# Patient Record
Sex: FEMALE | Race: WHITE
[De-identification: names, ages, dates, MRNs, and addresses within clinical notes are randomized per-mention and may not be internally consistent; named-entity substitution may affect disease eponyms.]

---

## 2017-09-14 ENCOUNTER — HOSPITAL ENCOUNTER (OUTPATIENT)
Dept: HOSPITAL 62 - RAD | Age: 57
End: 2017-09-14
Attending: FAMILY MEDICINE
Payer: MEDICARE

## 2017-09-14 DIAGNOSIS — G43.909: Primary | ICD-10-CM

## 2017-09-14 PROCEDURE — 70551 MRI BRAIN STEM W/O DYE: CPT

## 2017-09-14 NOTE — RADIOLOGY REPORT (SQ)
EXAM DESCRIPTION:  MRI HEAD WITHOUT



COMPLETED DATE/TIME:  9/14/2017 11:20 am



REASON FOR STUDY:  MIGRAINE G43.909  MIGRAINE, UNSP, NOT INTRACTABLE, WITHOUT STATUS MIGR



COMPARISON:  None.



TECHNIQUE:  Multiplanar imaging includes non-contrasted T1, T2, FLAIR, and diffusion with ADC map seq
uences. Images stored on PACS.



LIMITATIONS:  None.



FINDINGS:  ANATOMY: No developmental anomalies. Normal vascular flow voids. Pituitary fossa normal.

CSF SPACES: Normal in size and contour. No hemorrhage.

CEREBRUM: Sulci and gyri normal in size and contour.  Few punctate foci of increased white matter sig
nal on FLAIR imaging, likely benign gliosis along perivascular spaces.  .  No evidence of hemorrhage,
 mass, or extraaxial fluid collection.

POSTERIOR FOSSA: No signal alteration. No hemorrhage. No edema, masses or mass effect.  Internal davian
tory canals, cerebello-pontine angles, mastoids normal.

DIFFUSION IMAGING: Negative for acute or sub-acute infarction.

ORBITS: No masses. Globes normal.

PARANASAL  SINUSES: No fluid levels.  Mucosa normal.

OTHER: No other significant finding.



IMPRESSION:  ESSENTIALLY NORMAL MRI OF THE BRAIN WITHOUT INTRAVENOUS GADOLINIUM CONTRAST.

EVIDENCE OF ACUTE STROKE: NO.



TECHNICAL DOCUMENTATION:  JOB ID:  [id number]

 2011 [employer]- All Rights Reserved

## 2017-10-06 ENCOUNTER — HOSPITAL ENCOUNTER (EMERGENCY)
Dept: HOSPITAL 62 - ER | Age: 57
Discharge: HOME | End: 2017-10-06
Payer: MEDICARE

## 2017-10-06 VITALS — DIASTOLIC BLOOD PRESSURE: 67 MMHG | SYSTOLIC BLOOD PRESSURE: 112 MMHG

## 2017-10-06 DIAGNOSIS — G43.011: Primary | ICD-10-CM

## 2017-10-06 DIAGNOSIS — R03.0: ICD-10-CM

## 2017-10-06 DIAGNOSIS — H47.10: ICD-10-CM

## 2017-10-06 LAB
ALBUMIN SERPL-MCNC: 5 G/DL (ref 3.5–5)
ALP SERPL-CCNC: 167 U/L (ref 38–126)
ALT SERPL-CCNC: 22 U/L (ref 9–52)
ANION GAP SERPL CALC-SCNC: 19 MMOL/L (ref 5–19)
APPEARANCE ALL TUBES: CLEAR
APPEARANCE ALL TUBES: CLEAR
APPEARANCE TUBE1 CSF: CLEAR
APPEARANCE TUBE1 CSF: CLEAR
APPEARANCE TUBE2 CSF: CLEAR
APPEARANCE TUBE2 CSF: CLEAR
APPEARANCE TUBE3 CSF: CLEAR
APPEARANCE TUBE3 CSF: CLEAR
AST SERPL-CCNC: 22 U/L (ref 14–36)
BASOPHILS # BLD AUTO: 0 10^3/UL (ref 0–0.2)
BASOPHILS NFR BLD AUTO: 0.5 % (ref 0–2)
BILIRUB DIRECT SERPL-MCNC: 0.1 MG/DL (ref 0–0.4)
BILIRUB SERPL-MCNC: 0.5 MG/DL (ref 0.2–1.3)
BUN SERPL-MCNC: 15 MG/DL (ref 7–20)
CALCIUM: 10.3 MG/DL (ref 8.4–10.2)
CHLORIDE SERPL-SCNC: 99 MMOL/L (ref 98–107)
CO2 SERPL-SCNC: 27 MMOL/L (ref 22–30)
CREAT SERPL-MCNC: 1.19 MG/DL (ref 0.52–1.25)
EOSINOPHIL # BLD AUTO: 0.1 10^3/UL (ref 0–0.6)
EOSINOPHIL NFR BLD AUTO: 1.3 % (ref 0–6)
ERYTHROCYTE [DISTWIDTH] IN BLOOD BY AUTOMATED COUNT: 13.7 % (ref 11.5–14)
GLUCOSE CSF-MCNC: 89 MG/DL (ref 40–70)
GLUCOSE SERPL-MCNC: 132 MG/DL (ref 75–110)
HCT VFR BLD CALC: 39.8 % (ref 36–47)
HGB BLD-MCNC: 13.4 G/DL (ref 12–15.5)
HGB HCT DIFFERENCE: 0.4
LYMPHOCYTES # BLD AUTO: 2.9 10^3/UL (ref 0.5–4.7)
LYMPHOCYTES NFR BLD AUTO: 30.3 % (ref 13–45)
MCH RBC QN AUTO: 29.4 PG (ref 27–33.4)
MCHC RBC AUTO-ENTMCNC: 33.8 G/DL (ref 32–36)
MCV RBC AUTO: 87 FL (ref 80–97)
MONOCYTES # BLD AUTO: 0.6 10^3/UL (ref 0.1–1.4)
MONOCYTES NFR BLD AUTO: 6.4 % (ref 3–13)
NEUTROPHILS # BLD AUTO: 5.8 10^3/UL (ref 1.7–8.2)
NEUTS SEG NFR BLD AUTO: 61.5 % (ref 42–78)
POTASSIUM SERPL-SCNC: 4 MMOL/L (ref 3.6–5)
PROT SERPL-MCNC: 8.8 G/DL (ref 6.3–8.2)
RBC # BLD AUTO: 4.58 10^6/UL (ref 3.72–5.28)
RBC AVERAGE: 136.5
RBC AVERAGE: 26
RBC DILUENT USED: (no result)
RBC DILUENT USED: (no result)
RBC DILUTION FACTOR: 1
RBC DILUTION FACTOR: 1
RBC SIDE 1: 135
RBC SIDE 1: 27
RBC SIDE 2: 138
RBC SIDE 2: 25
SODIUM SERPL-SCNC: 144.8 MMOL/L (ref 137–145)
TOTAL RBC SQUARES COUNTED: 225
TOTAL RBC SQUARES COUNTED: 50
WBC # BLD AUTO: 9.5 10^3/UL (ref 4–10.5)

## 2017-10-06 PROCEDURE — 87070 CULTURE OTHR SPECIMN AEROBIC: CPT

## 2017-10-06 PROCEDURE — 86480 TB TEST CELL IMMUN MEASURE: CPT

## 2017-10-06 PROCEDURE — 80053 COMPREHEN METABOLIC PANEL: CPT

## 2017-10-06 PROCEDURE — 86696 HERPES SIMPLEX TYPE 2 TEST: CPT

## 2017-10-06 PROCEDURE — 84157 ASSAY OF PROTEIN OTHER: CPT

## 2017-10-06 PROCEDURE — 86812 HLA TYPING A B OR C: CPT

## 2017-10-06 PROCEDURE — 86787 VARICELLA-ZOSTER ANTIBODY: CPT

## 2017-10-06 PROCEDURE — 86225 DNA ANTIBODY NATIVE: CPT

## 2017-10-06 PROCEDURE — 87205 SMEAR GRAM STAIN: CPT

## 2017-10-06 PROCEDURE — 86617 LYME DISEASE ANTIBODY: CPT

## 2017-10-06 PROCEDURE — 96374 THER/PROPH/DIAG INJ IV PUSH: CPT

## 2017-10-06 PROCEDURE — 82164 ANGIOTENSIN I ENZYME TEST: CPT

## 2017-10-06 PROCEDURE — 83516 IMMUNOASSAY NONANTIBODY: CPT

## 2017-10-06 PROCEDURE — 36415 COLL VENOUS BLD VENIPUNCTURE: CPT

## 2017-10-06 PROCEDURE — 86430 RHEUMATOID FACTOR TEST QUAL: CPT

## 2017-10-06 PROCEDURE — 00JU3ZZ INSPECTION OF SPINAL CANAL, PERCUTANEOUS APPROACH: ICD-10-PCS | Performed by: EMERGENCY MEDICINE

## 2017-10-06 PROCEDURE — 86256 FLUORESCENT ANTIBODY TITER: CPT

## 2017-10-06 PROCEDURE — 70450 CT HEAD/BRAIN W/O DYE: CPT

## 2017-10-06 PROCEDURE — 86778 TOXOPLASMA ANTIBODY IGM: CPT

## 2017-10-06 PROCEDURE — 86695 HERPES SIMPLEX TYPE 1 TEST: CPT

## 2017-10-06 PROCEDURE — 86618 LYME DISEASE ANTIBODY: CPT

## 2017-10-06 PROCEDURE — 99284 EMERGENCY DEPT VISIT MOD MDM: CPT

## 2017-10-06 PROCEDURE — 86235 NUCLEAR ANTIGEN ANTIBODY: CPT

## 2017-10-06 PROCEDURE — 96375 TX/PRO/DX INJ NEW DRUG ADDON: CPT

## 2017-10-06 PROCEDURE — 71020: CPT

## 2017-10-06 PROCEDURE — 62270 DX LMBR SPI PNXR: CPT

## 2017-10-06 PROCEDURE — 89050 BODY FLUID CELL COUNT: CPT

## 2017-10-06 PROCEDURE — 86682 HELMINTH ANTIBODY: CPT

## 2017-10-06 PROCEDURE — 86592 SYPHILIS TEST NON-TREP QUAL: CPT

## 2017-10-06 PROCEDURE — 86780 TREPONEMA PALLIDUM: CPT

## 2017-10-06 PROCEDURE — 85025 COMPLETE CBC W/AUTO DIFF WBC: CPT

## 2017-10-06 PROCEDURE — 82945 GLUCOSE OTHER FLUID: CPT

## 2017-10-06 PROCEDURE — 87496 CYTOMEG DNA AMP PROBE: CPT

## 2017-10-06 NOTE — ER DOCUMENT REPORT
ED Medical Screen (RME)





- General


Chief Complaint: Headache >24 hrs old


Stated Complaint: ABNORMAL LABS


Time Seen by Provider: 10/06/17 14:29


Mode of Arrival: Ambulatory


Information source: Patient


Notes: 





pt sent in by Dr Duckworth for multiple tests imaging





I have greeted and performed a rapid initial assessment of this patient.  A 

comprehensive ED assessment and evaluation of the patient, analysis of test 

results and completion of the medical decision making process will be conducted 

by additional ED providers.





PHYSICAL EXAMINATION:





GENERAL: Well-appearing, well-nourished and in no acute distress.





HEAD: Atraumatic, normocephalic.





EYES: Pupils equal round extraocular movements intact,  conjunctiva are normal.





ENT: Nares patent





NECK: Normal range of motion





LUNGS: No respiratory distress





Musculoskeletal: Normal range of motion





NEUROLOGICAL:  Normal speech, normal gait. 





PSYCH: Normal mood, normal affect.





SKIN: Warm, Dry, normal turgor, no rashes or lesions noted.


TRAVEL OUTSIDE OF THE [place] IN LAST 30 DAYS: No





- Related Data


Allergies/Adverse Reactions: 


 





Penicillins Allergy (Verified 10/06/17 14:14)


 











Past Medical History


Renal/ Medical History: Denies: Hx Peritoneal Dialysis





Physical Exam





- Vital signs


Vitals: 





 











Temp Pulse Resp BP Pulse Ox


 


 98.3 F   129 H  16   135/81 H  96 


 


 10/06/17 14:12  10/06/17 14:12  10/06/17 14:12  10/06/17 14:12  10/06/17 14:12














Course





- Vital Signs


Vital signs: 





 











Temp Pulse Resp BP Pulse Ox


 


 98.3 F   129 H  16   135/81 H  96 


 


 10/06/17 14:12  10/06/17 14:12  10/06/17 14:12  10/06/17 14:12  10/06/17 14:12

## 2017-10-06 NOTE — RADIOLOGY REPORT (SQ)
EXAM DESCRIPTION:  CT HEAD WITHOUT



COMPLETED DATE/TIME:  10/6/2017 3:06 pm



REASON FOR STUDY:  headache



COMPARISON:  MR of the head 9/14/2017



TECHNIQUE:  Axial images acquired through the brain without intravenous contrast.  Images reviewed wi
th bone, brain and subdural windows.  Images stored on PACS.

All CT scanners at this facility use dose modulation, iterative reconstruction, and/or weight based d
osing when appropriate to reduce radiation dose to as low as reasonably achievable (ALARA).

[facility]: Dose Right  [facility]: CareDose    [facility]: Dose Right    [facility]: Teradose 4D    [facility]: Smart Technologies



RADIATION DOSE:  Up-to-date CT equipment and radiation dose reduction techniques were employed. CTDIv
ol: 64.6 mGy. DLP: 1034 mGy-cm. mGy.



LIMITATIONS:  None.



FINDINGS:  VENTRICLES: Normal size and contour.

CEREBRUM: No masses.  No hemorrhage.  No midline shift.  No evidence for acute infarction. Normal gra
y/white matter differentiation. No areas of low density in the white matter.

CEREBELLUM: No masses.  No hemorrhage.  No alteration of density.  No evidence for acute infarction.

EXTRAAXIAL SPACES: No fluid collections.  No masses.

ORBITS AND GLOBE: No intra- or extraconal masses.  Normal contour of globe without masses.

CALVARIUM: No fracture.

PARANASAL SINUSES: No fluid or mucosal thickening.

SOFT TISSUES: No mass or hematoma.

OTHER: No other significant finding.



IMPRESSION:  NORMAL BRAIN CT WITHOUT CONTRAST.

EVIDENCE OF ACUTE STROKE: NO.



COMMENT:  Quality ID # 436: Final reports with documentation of one or more dose reduction techniques
 (e.g., Automated exposure control, adjustment of the mA and/or kV according to patient size, use of 
iterative reconstruction technique)



TECHNICAL DOCUMENTATION:  JOB ID:  [id number]

 2011 [employer]- All Rights Reserved

## 2017-10-06 NOTE — RADIOLOGY REPORT (SQ)
EXAM DESCRIPTION:  CHEST PA/LAT



COMPLETED DATE/TIME:  10/6/2017 3:16 pm



REASON FOR STUDY:  headache



COMPARISON:  11/12/2009



EXAM PARAMETERS:  NUMBER OF VIEWS: two views

TECHNIQUE: Digital Frontal and Lateral radiographic views of the chest acquired.

RADIATION DOSE: NA

LIMITATIONS: Overlying breast tissue.



FINDINGS:  LUNGS AND PLEURA: No opacities, masses or pneumothorax. No pleural effusion.

MEDIASTINUM AND HILAR STRUCTURES: No masses or contour abnormalities.

HEART AND VASCULAR STRUCTURES: Heart normal size.  No evidence for failure.

BONES: No acute findings.

HARDWARE: None in the chest.

OTHER: No other significant finding.



IMPRESSION:  NO ACUTE RADIOGRAPHIC FINDING IN THE CHEST.



TECHNICAL DOCUMENTATION:  JOB ID:  [id number]

 2011 [employer]- All Rights Reserved

## 2017-10-06 NOTE — ER DOCUMENT REPORT
ED General





- General


Mode of Arrival: Ambulatory


Information source: Patient


TRAVEL OUTSIDE OF THE [place] IN LAST 30 DAYS: No





<SHEELA MCCABE - Last Filed: 10/06/17 18:18>





<MARY HUGHES - Last Filed: 10/06/17 18:56>





- General


Chief Complaint: Headache >24 hrs old


Stated Complaint: ABNORMAL LABS


Time Seen by Provider: 10/06/17 14:29


Notes: 





Patient is a 57 year old female that presents to the emergency department today 

after being seen by a retina specialist and was found to have papilledema. Dr. Duckworth (ophthalmology) called to inform ED staff of the patient and labs and 

procedures that he wanted done to rule out possible meningitis. Patient states 

her only symptom has been migraine headaches. Patient states she has a history 

of migraine headaches but they have been getting worse over the last month. 

Patient denies any focal neurological deficits.  (SHEELA MCCABE)





- Related Data


Allergies/Adverse Reactions: 


 





Penicillins Allergy (Verified 10/06/17 14:14)


 











Past Medical History





- General


Information source: Patient





- Social History


Smoking Status: Unknown if Ever Smoked


Cigarette use (# per day): No


Frequency of alcohol use: Rare


Drug Abuse: None


Lives with: Family


Family History: Reviewed & Not Pertinent


Patient has suicidal ideation: No


Patient has homicidal ideation: No





- Medical History


Medical History: Negative


Surgical Hx: Negative





<SHEELA MCCABE - Last Filed: 10/06/17 18:18>





Review of Systems





- Review of Systems


Constitutional: No symptoms reported


EENT: No symptoms reported


Cardiovascular: No symptoms reported


Respiratory: No symptoms reported


Gastrointestinal: No symptoms reported


Genitourinary: No symptoms reported


Female Genitourinary: No symptoms reported


Musculoskeletal: No symptoms reported


Skin: No symptoms reported


Hematologic/Lymphatic: No symptoms reported


Neurological/Psychological: See HPI, Headaches


-: Yes All other systems reviewed and negative





<SHEELA CMCABE - Last Filed: 10/06/17 18:18>





Physical Exam





<SHEELA MCCABE - Last Filed: 10/06/17 18:18>





<MARY HUGHES - Last Filed: 10/06/17 18:56>





- Vital signs


Vitals: 





 











Temp Pulse Resp BP Pulse Ox


 


 98.3 F   129 H  16   135/81 H  96 


 


 10/06/17 14:12  10/06/17 14:12  10/06/17 14:12  10/06/17 14:12  10/06/17 14:12














- Notes


Notes: 





PHYSICAL EXAM


 


GENERAL: Alert, interacts well. No acute distress.


HEAD: Normocephalic, atraumatic.


EYES: Pupils equal, round, and reactive to light. Extraocular movements intact.


ENT: Oral mucosa moist, tongue midline. 


NECK: Full range of motion. Supple. Trachea midline.


LUNGS: Clear to auscultation bilaterally, no wheezes, rales, or rhonchi. No 

respiratory distress.


HEART: Regular rate and rhythm. No murmurs, gallops, or rubs.


ABDOMEN: Obese, soft, non-tender. Non-distended. Bowel sounds present in all 4 

quadrants.


EXTREMITIES: Moves all 4 extremities spontaneously. No cyanosis.


NEUROLOGICAL: Alert and oriented x3. Normal speech. No focal neurological 

deficits. 


PSYCH: Normal affect, normal mood.


SKIN: Warm, dry, normal turgor. No rashes or lesions noted. (SHEELA MCCABE)





Course





- Laboratory


Result Diagrams: 


 10/06/17 15:30





 10/06/17 15:30





<SHEELA MCCABE - Last Filed: 10/06/17 18:18>





- Laboratory


Result Diagrams: 


 10/06/17 15:30





 10/06/17 15:30





<MARY HUGHES - Last Filed: 10/06/17 18:56>





- Re-evaluation


Re-evalutation: 





10/06/17 18:54


CBC unremarkable, CMP grossly unremarkable, no abnormalities that would affect 

today's diagnosis, lumbar puncture shows a traumatic tap with 682 RBCs in the 

first tube and down to 28 RBCs in the's fourth tube, no evidence of infection, 

2 WBC in the first and 1 in the fourth tube.  Slightly elevated glucose, normal 

protein, all of the other laboratory studies that were ordered were ordered on 

behalf of Dr. Duckworth and at his request, these will be followed up by him as an 

outpatient.  Patient's headache was improved significantly by Toradol, Compazine

, Benadryl.  Patient will be given Decadron to help prevent rebound headache 

and will be discharged to home.  Patient was given Ativan to help with her 

anxiety regarding the lumbar puncture. (MARY HUGHES)





- Vital Signs


Vital signs: 





 











Temp Pulse Resp BP Pulse Ox


 


 98.3 F   129 H  16   135/81 H  96 


 


 10/06/17 14:12  10/06/17 14:12  10/06/17 14:12  10/06/17 14:12  10/06/17 14:12














- Laboratory


Laboratory results interpreted by me: 





 











  10/06/17 10/06/17





  15:30 17:45


 


Est GFR ( Amer)  57 L 


 


Est GFR (Non-Af Amer)  47 L 


 


Glucose  132 H 


 


Calcium  10.3 H 


 


Alkaline Phosphatase  167 H 


 


Total Protein  8.8 H 


 


CSF Glucose   89 H














Procedures





- Lumbar Puncture


  ** Lumbar puncture


Consent obtained: Yes


Lumbar puncture pre-procedure: Sterile PPE donned, Betadine prep applied, 

Sterile drapes applied


Patient position: Lying


Needle size: 22


Lumbar puncture location: L3-L4


Anesthetic type: 1% Lidocaine


mL's of anesthetic: 10


Amount/type of drainage: 6 mL clear


Number of attempts: 1


Complications: No





<MARY HUGHES - Last Filed: 10/06/17 18:56>





- Lumbar Puncture


  ** Lumbar puncture


Notes: 





10/06/17 18:54


Opening pressure 18. (MARY HUGHES)





Discharge





<SHEELA MCCABE - Last Filed: 10/06/17 18:18>





<MARY HUGHES - Last Filed: 10/06/17 18:56>





- Discharge


Clinical Impression: 


 Papilledema, Prehypertension





Migraine


Qualifiers:


 Migraine type: without aura Status migrainosus presence: with status 

migrainosus Intractability: intractable Qualified Code(s): G43.011 - Migraine 

without aura, intractable, with status migrainosus





Condition: Stable


Disposition: HOME, SELF-CARE


Additional Instructions: 


Today your CAT scan did not show any signs of bleeding or mass.  Your lumbar 

puncture did not show any increased intracranial pressure, bleeding or 

infection.  We treated her headache with Toradol, Compazine and Benadryl.  You 

have been given Decadron to help prevent a rebound headache.  Please follow-up 

with Dr. Duckworth as an outpatient.  He will need to contact to the hospital to get 

the rest of your blood work results.


Scribe Attestation: 





10/06/17 18:56


I personally performed the services described in the documentation, reviewed 

and edited the documentation which was dictated to the scribe in my presence, 

and it accurately records my words and actions. (KLOCEK,MARY)





Scribe Documentation





- Scribe


Written by Zamzam:: Zamzam Adams, 10/6/2017 1833


acting as scribe for :: Markie





<SHEELA MCCABE - Last Filed: 10/06/17 18:18>

## 2017-10-09 LAB
ENA JO1 AB SER-ACNC: <0.2 AI (ref 0–0.9)
LYME DISEASE IGG AND IGM AB: <0.91 ISR (ref 0–0.9)
T GONDII IGM SER QL: <3 AU/ML (ref 0–7.9)

## 2018-01-31 ENCOUNTER — HOSPITAL ENCOUNTER (EMERGENCY)
Dept: HOSPITAL 62 - ER | Age: 58
Discharge: HOME | End: 2018-01-31
Payer: MEDICARE

## 2018-01-31 VITALS — DIASTOLIC BLOOD PRESSURE: 74 MMHG | SYSTOLIC BLOOD PRESSURE: 104 MMHG

## 2018-01-31 DIAGNOSIS — Z98.890: ICD-10-CM

## 2018-01-31 DIAGNOSIS — Z88.0: ICD-10-CM

## 2018-01-31 DIAGNOSIS — M54.2: Primary | ICD-10-CM
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
## 2018-01-31 DIAGNOSIS — I10: ICD-10-CM

## 2018-01-31 DIAGNOSIS — M25.512: ICD-10-CM

## 2018-01-31 DIAGNOSIS — J45.909: ICD-10-CM

## 2018-01-31 PROCEDURE — 99284 EMERGENCY DEPT VISIT MOD MDM: CPT

## 2018-01-31 PROCEDURE — 72125 CT NECK SPINE W/O DYE: CPT

## 2018-01-31 NOTE — ER DOCUMENT REPORT
ED Fall





- General


Mode of Arrival: Ambulatory


Information source: Patient


TRAVEL OUTSIDE OF THE [place] IN LAST 30 DAYS: No





- General


Chief Complaint: Fall Injury


Stated Complaint: FALL/SHOULDER AND NECK PAIN


Time Seen by Provider: 18 13:52


Notes: 


Patient is a 57 year old female that presents to the emergency department today 

with complaints of a fall that occurred yesterday. Patient states she fell down 

approximately 12 steps. Patient states she is unsure if she hit her head. 

Patient did have recent neck surgery on 2018. Patient complains of neck 

pain and left shoulder pain. Patient denies vision changes, nausea, vomiting, 

or usage of blood thinners. (SHEELA MCCABE)





- Related data


Allergies/Adverse Reactions: 


 





Penicillins Allergy (Verified 18 13:22)


 











Past Medical History





- General


Information source: Patient





- Social History


Smoking Status: Unknown if Ever Smoked


Frequency of alcohol use: Rare


Drug Abuse: None


Lives with: Family


Family History: Reviewed & Not Pertinent


Patient has suicidal ideation: No


Patient has homicidal ideation: No





- Past Medical History


Cardiac Medical History: Reports: Hx Hypertension


Pulmonary Medical History: Reports: Hx Asthma


GI Medical History: Reports: Hx Gastroesophageal Reflux Disease


Past Surgical History: Reports: Hx  Section, Hx Orthopedic Surgery - 

neck





Review of Systems





- Review of Systems


Constitutional: No symptoms reported


EENT: denies: Blurred vision, Double vision


Cardiovascular: No symptoms reported


Respiratory: No symptoms reported


Gastrointestinal: denies: Nausea, Vomiting


Genitourinary: No symptoms reported


Female Genitourinary: No symptoms reported


Musculoskeletal: See HPI, Joint pain - left shoulder, Neck pain


Skin: No symptoms reported


Hematologic/Lymphatic: No symptoms reported


Neurological/Psychological: No symptoms reported


-: Yes All other systems reviewed and negative





Physical Exam





- Vital signs


Vitals: 


 











Temp Pulse Resp BP Pulse Ox


 


 98.4 F   125 H  20   109/54 L  99 


 


 18 13:30  18 13:30  18 13:30  18 13:30  18 13:30














- Notes


Notes: 





Physical Exam:


 


General: Alert, appears well. 


 


HEENT: Normocephalic. Atraumatic. PERRL. Extraocular movements intact. 

Oropharynx clear.


 


Neck: Soft collar in place, cervical spine tenderness with palpation, no step-

off or deformities. 





Respiratory: No respiratory distress. Clear and equal breath sounds bilaterally.


 


Cardiovascular: Regular rate and rhythm. 


 


Abdominal: Normal Inspection. Non-tender. No distension. Normal Bowel Sounds. 


 


Back: Non-tender. No deformity or step off.


 


Extremities: Moves all four extremities.


Upper extremities: Unable to raise bilateral upper extremities above shoulder 

level secondary to pain.


Lower extremities: Normal inspection. No edema. Normal ROM.


 


Neurological: Normal cognition. AAOx4. Normal speech.  


 


Psychological: Normal affect. Normal Mood. 


 


Skin: Warm. Dry. Normal color. (SHEELA MCCABE)





Course





- Re-evaluation


Re-evalutation: 





18 15:22


Imaging shows no acute abnormalities patient well-appearing with steady gait 

with no bruising on her body.  She is requesting narcotics.  Reviewing charts 

she was provided a narcotic pills just on .  I explained that I 

cannot prescribe her any more narcotic medications and she had a follow-up with 

her primary care physician.  I did discuss that her imaging was negative for 

any fractures or dislocations. (GISSELLE CHRISTENSEN)





- Vital Signs


Vital signs: 


 











Temp Pulse Resp BP Pulse Ox


 


 99.0 F   103 H  18   104/74   98 


 


 18 15:28  18 15:28  18 15:28  18 15:28  18 15:28














Discharge





- Discharge


Clinical Impression: 


 Fall (on) (from) other stairs and steps, initial encounter





Condition: Good


Disposition: HOME, SELF-CARE


Instructions:  Muscle Strain ([facility])


Additional Instructions: 


Please follow-up with your primary care physician regarding her pain 

medications.


Prescriptions: 


Naproxen 500 mg PO BID #30 tablet


Referrals: 


MANUEL SMITH MD [Primary Care Provider] - Follow up as needed





Scribe Documentation





- Scribe


Written by Scribe:: Zamzam Adams, 2018 1630


acting as scribe for :: Dex

## 2018-01-31 NOTE — RADIOLOGY REPORT (SQ)
EXAM DESCRIPTION:  HIP LEFT AP/LATERAL



COMPLETED DATE/TIME:  1/31/2018 2:29 pm



REASON FOR STUDY:  trauma



COMPARISON:  None.



NUMBER OF VIEWS:  Two views.



TECHNIQUE:  AP pelvis and additional frog-leg view of the left hip.



LIMITATIONS:  Large patient



FINDINGS:  MINERALIZATION: Normal.

LEFT HIP: No fracture or dislocation.  No worrisome bone lesions.  No significant joint space narrowi
ng.  Minimal acetabular rim bony spurring.

RIGHT HIP: No fracture or dislocation.  No worrisome bone lesions.  No significant joint space narrow
ing or acetabular rim bony spurring.

PUBIS AND ISCHIUM: No fracture.

PELVIS: No fracture.

SACRUM: No fracture or dislocation. No worrisome bone lesions.

LOWER LUMBAR SPINE: Lower lumbar bilateral facet arthropathy at L4-5 and L5-S1

SOFT TISSUES: No findings.

OTHER: No other significant finding.



IMPRESSION:  No acute fracture



TECHNICAL DOCUMENTATION:  JOB ID:  [id number]

 2011 [employer]- All Rights Reserved

## 2018-01-31 NOTE — RADIOLOGY REPORT (SQ)
EXAM DESCRIPTION:  SHOULDER LEFT 2 OR MORE VIEWS



COMPLETED DATE/TIME:  1/31/2018 2:29 pm



REASON FOR STUDY:  trauma



COMPARISON:  None.



NUMBER OF VIEWS:  Three views.



TECHNIQUE:  Internal rotation, external rotation, and Y view images acquired of the left shoulder.



LIMITATIONS:  None.



FINDINGS:  MINERALIZATION: Normal.

BONES: No acute fracture or dislocation.  No worrisome bone lesions.

JOINTS: No dislocation.

VISUALIZED LUNGS AND RIBS: No pneumothorax.  No rib fracture.

SOFT TISSUES: No radiopaque foreign body.

OTHER: No other significant finding.



IMPRESSION:  NEGATIVE STUDY OF THE LEFT SHOULDER. NO RADIOGRAPHIC EVIDENCE OF ACUTE INJURY.



TECHNICAL DOCUMENTATION:  JOB ID:  [id number]

 2011 [employer]- All Rights Reserved

## 2018-01-31 NOTE — RADIOLOGY REPORT (SQ)
EXAM DESCRIPTION:  CT CERVICAL SPINE WITHOUT



COMPLETED DATE/TIME:  1/31/2018 2:31 pm



REASON FOR STUDY:  trauma



COMPARISON:  None.



TECHNIQUE:  Axial images acquired through the cervical spine without intravenous contrast.  Images re
viewed with lung, soft tissue and bone windows.  Reconstructed coronal and sagittal MPR images review
ed.  Images stored on PACS.

All CT scanners at this facility use dose modulation, iterative reconstruction, and/or weight based d
osing when appropriate to reduce radiation dose to as low as reasonably achievable (ALARA).

[facility]: Dose Right  [facility]: CareDose    [facility]: Dose Right    [facility]: Teradose 4D    [facility]: Smart Brav[employer]ly



RADIATION DOSE:  CT Rad equipment meets quality standard of care and radiation dose reduction techniq
ues were employed. CTDIvol: 21.4 mGy. DLP: 441 mGy-cm. mGy.



LIMITATIONS:  Motion.  Artifact from multilevel ACD.



FINDINGS:  ALIGNMENT: Anatomic.

MINERALIZATION: Normal.

VERTEBRAL BODIES: No fractures or dislocation.

DISCS: See below.

FACETS, LATERAL MASSES, POSTERIOR ELEMENTS: No fractures.  No dislocation.  No acute findings.

HARDWARE: Status post ACD C3- 4, C4-5, C5-6 and C6-7.

VISUALIZED RIBS: No fractures.

LUNG APICES AND SOFT TISSUES: No significant or acute findings.

OTHER: No other significant finding.



IMPRESSION:  Postsurgical changes.  No acute findings.



TECHNICAL DOCUMENTATION:  JOB ID:  [id number]

Quality ID # 436: Final reports with documentation of one or more dose reduction techniques (e.g., Au
tomated exposure control, adjustment of the mA and/or kV according to patient size, use of iterative 
reconstruction technique)

 2011 [employer]- All Rights Reserved

## 2018-03-21 ENCOUNTER — HOSPITAL ENCOUNTER (EMERGENCY)
Dept: HOSPITAL 62 - ER | Age: 58
Discharge: HOME | End: 2018-03-21
Payer: MEDICARE

## 2018-03-21 VITALS — DIASTOLIC BLOOD PRESSURE: 59 MMHG | SYSTOLIC BLOOD PRESSURE: 133 MMHG

## 2018-03-21 DIAGNOSIS — Z88.0: ICD-10-CM

## 2018-03-21 DIAGNOSIS — R51: Primary | ICD-10-CM

## 2018-03-21 DIAGNOSIS — Z98.890: ICD-10-CM

## 2018-03-21 DIAGNOSIS — Z87.891: ICD-10-CM

## 2018-03-21 DIAGNOSIS — I10: ICD-10-CM

## 2018-03-21 DIAGNOSIS — J45.909: ICD-10-CM

## 2018-03-21 PROCEDURE — 96374 THER/PROPH/DIAG INJ IV PUSH: CPT

## 2018-03-21 PROCEDURE — 96361 HYDRATE IV INFUSION ADD-ON: CPT

## 2018-03-21 PROCEDURE — 99283 EMERGENCY DEPT VISIT LOW MDM: CPT

## 2018-03-21 PROCEDURE — 96375 TX/PRO/DX INJ NEW DRUG ADDON: CPT

## 2018-03-21 NOTE — ER DOCUMENT REPORT
ED Headache





- General


Chief Complaint: Headache


Stated Complaint: HEADACHE


Time Seen by Provider: 18 08:54


Mode of Arrival: Ambulatory


Information source: Patient


Notes: 





57 yo female with forehead and back or skull headache(origin) for 8 days, 

started 3/5, now 4.5. Sent by dr. Seals, he gave injection which helped some 

Monday.  Hx migraines in same location just lasting longer. Thinks maybe it is 

from neck. Cervical spine surgery 


2018- C3-8, 4 [facility]- [facility]). Pain management 

10mg percocet qid. No fever.


TRAVEL OUTSIDE OF THE [place] IN LAST 30 DAYS: No





- Related Data


Allergies/Adverse Reactions: 


 





Penicillins Allergy (Verified 18 13:22)


 











Past Medical History





- General


Information source: Patient





- Social History


Smoking Status: Never Smoker


Frequency of alcohol use: None


Drug Abuse: None


Lives with: Spouse/Significant other


Family History: Reviewed & Not Pertinent





- Past Medical History


Cardiac Medical History: Reports: Hx Hypertension


Pulmonary Medical History: Reports: Hx Asthma


Renal/ Medical History: Denies: Hx Peritoneal Dialysis


GI Medical History: Reports: Hx Gastroesophageal Reflux Disease


Past Surgical History: Reports: Hx  Section, Hx Orthopedic Surgery - 

cervical spine, Hx Tonsillectomy





Review of Systems





- Review of Systems


Constitutional: No symptoms reported


EENT: No symptoms reported


Cardiovascular: No symptoms reported


Respiratory: No symptoms reported


Gastrointestinal: No symptoms reported


Genitourinary: No symptoms reported


Female Genitourinary: No symptoms reported


Musculoskeletal: No symptoms reported


Skin: No symptoms reported


Hematologic/Lymphatic: No symptoms reported


Neurological/Psychological: See HPI





Physical Exam





- Vital signs


Vitals: 


 











Temp Pulse Resp BP Pulse Ox


 


 97.9 F   124 H  16   131/89 H  100 


 


 18 08:43  18 08:43  18 08:43  18 08:43  18 08:43











Interpretation: Normal





- General


General appearance: Appears well, Alert, Anxious





- HEENT


Head: Normocephalic, Atraumatic


Eyes: Normal


Pupils: PERRL





- Respiratory


Respiratory status: No respiratory distress


Chest status: Nontender


Breath sounds: Normal


Chest palpation: Normal





- Cardiovascular


Rhythm: Regular


Heart sounds: Normal auscultation


Murmur: No





- Abdominal


Inspection: Normal


Distension: No distension


Bowel sounds: Normal


Tenderness: Nontender


Organomegaly: No organomegaly





- Back


Back: Normal, Nontender





- Extremities


General upper extremity: Normal inspection, Nontender, Normal color, Normal ROM

, Normal temperature


General lower extremity: Normal inspection, Nontender, Normal color, Normal ROM

, Normal temperature, Normal weight bearing.  No: Claire's sign





- Neurological


Neuro grossly intact: Yes


Cognition: Normal


Orientation: AAOx4


Rulo Coma Scale Eye Opening: Spontaneous


Rulo Coma Scale Verbal: Oriented


Екатерина Coma Scale Motor: Obeys Commands


Екатерина Coma Scale Total: 15


Speech: Normal


Motor strength normal: LUE, RUE, LLE, RLE


Sensory: Normal





- Psychological


Associated symptoms: Normal affect, Normal mood





- Skin


Skin Temperature: Warm


Skin Moisture: Dry


Skin Color: Normal





Course





- Re-evaluation


Re-evalutation: 





18 10:32


Headache is down to 2.5/5 and she feels like she can go home she feels a lot 

better.





- Vital Signs


Vital signs: 


 











Temp Pulse Resp BP Pulse Ox


 


 97.3 F   108 H  20   133/59 H  97 


 


 18 10:33  18 10:33  18 10:33  18 10:33  18 10:33














Discharge





- Discharge


Clinical Impression: 


Headache


Qualifiers:


 Headache type: unspecified Headache chronicity pattern: episodic headache 

Intractability: not intractable Qualified Code(s): R51 - Headache





Condition: Good


Disposition: HOME, SELF-CARE


Instructions:  Intravenous Compazine for Headaches ([facility]), Use of Diphenhydramine

, Headache ([facility]), Toradol Injection ([facility])


Additional Instructions: 


see dr seals for follow-up 


return to the emergency room if symptoms worsen


see neurologist for headaches


Referrals: 


NO SEALS MD [Primary Care Provider] - Follow up as needed


ARANZA MCWILLIAMS MD [NO LOCAL MD] - Follow up as needed